# Patient Record
Sex: MALE | Race: WHITE | NOT HISPANIC OR LATINO | Employment: PART TIME | URBAN - NONMETROPOLITAN AREA
[De-identification: names, ages, dates, MRNs, and addresses within clinical notes are randomized per-mention and may not be internally consistent; named-entity substitution may affect disease eponyms.]

---

## 2021-03-22 ENCOUNTER — IMMUNIZATION (OUTPATIENT)
Dept: VACCINE CLINIC | Facility: HOSPITAL | Age: 21
End: 2021-03-22

## 2021-03-22 PROCEDURE — 91300 HC SARSCOV02 VAC 30MCG/0.3ML IM: CPT | Performed by: INTERNAL MEDICINE

## 2021-03-22 PROCEDURE — 0001A: CPT | Performed by: INTERNAL MEDICINE

## 2021-04-12 ENCOUNTER — IMMUNIZATION (OUTPATIENT)
Dept: VACCINE CLINIC | Facility: HOSPITAL | Age: 21
End: 2021-04-12

## 2021-04-12 PROCEDURE — 91300 HC SARSCOV02 VAC 30MCG/0.3ML IM: CPT | Performed by: INTERNAL MEDICINE

## 2021-04-12 PROCEDURE — 0002A: CPT | Performed by: INTERNAL MEDICINE

## 2022-09-05 ENCOUNTER — APPOINTMENT (OUTPATIENT)
Dept: CT IMAGING | Facility: HOSPITAL | Age: 22
End: 2022-09-05
Payer: MEDICAID

## 2022-09-05 ENCOUNTER — HOSPITAL ENCOUNTER (EMERGENCY)
Facility: HOSPITAL | Age: 22
Discharge: ANOTHER HOSPITAL | End: 2022-09-06
Attending: EMERGENCY MEDICINE
Payer: MEDICAID

## 2022-09-05 VITALS
SYSTOLIC BLOOD PRESSURE: 113 MMHG | DIASTOLIC BLOOD PRESSURE: 71 MMHG | HEIGHT: 68 IN | WEIGHT: 185 LBS | BODY MASS INDEX: 28.04 KG/M2 | OXYGEN SATURATION: 96 % | HEART RATE: 60 BPM | RESPIRATION RATE: 14 BRPM | TEMPERATURE: 98.1 F

## 2022-09-05 DIAGNOSIS — K35.30 ACUTE APPENDICITIS WITH LOCALIZED PERITONITIS, WITHOUT PERFORATION, ABSCESS, OR GANGRENE: Primary | ICD-10-CM

## 2022-09-05 LAB
A/G RATIO: 1.7 (ref 0.8–2)
ALBUMIN SERPL-MCNC: 4.7 G/DL (ref 3.4–4.8)
ALP BLD-CCNC: 67 U/L (ref 25–100)
ALT SERPL-CCNC: 20 U/L (ref 4–36)
ANION GAP SERPL CALCULATED.3IONS-SCNC: 10 MMOL/L (ref 3–16)
AST SERPL-CCNC: 16 U/L (ref 8–33)
BASOPHILS ABSOLUTE: 0 K/UL (ref 0–0.1)
BASOPHILS RELATIVE PERCENT: 0.2 %
BILIRUB SERPL-MCNC: 0.8 MG/DL (ref 0.3–1.2)
BILIRUBIN URINE: NEGATIVE
BLOOD, URINE: NEGATIVE
BUN BLDV-MCNC: 15 MG/DL (ref 6–20)
CALCIUM SERPL-MCNC: 9.3 MG/DL (ref 8.5–10.5)
CHLORIDE BLD-SCNC: 101 MMOL/L (ref 98–107)
CLARITY: CLEAR
CO2: 25 MMOL/L (ref 20–30)
COLOR: YELLOW
CREAT SERPL-MCNC: 0.8 MG/DL (ref 0.4–1.2)
EOSINOPHILS ABSOLUTE: 0 K/UL (ref 0–0.4)
EOSINOPHILS RELATIVE PERCENT: 0 %
GFR AFRICAN AMERICAN: >59
GFR NON-AFRICAN AMERICAN: >59
GLOBULIN: 2.7 G/DL
GLUCOSE BLD-MCNC: 153 MG/DL (ref 74–106)
GLUCOSE URINE: NEGATIVE MG/DL
HCT VFR BLD CALC: 42.5 % (ref 40–54)
HEMOGLOBIN: 14.3 G/DL (ref 13–18)
IMMATURE GRANULOCYTES #: 0.1 K/UL
IMMATURE GRANULOCYTES %: 0.4 % (ref 0–5)
KETONES, URINE: 80 MG/DL
LEUKOCYTE ESTERASE, URINE: NEGATIVE
LIPASE: 11 U/L (ref 5.6–51.3)
LYMPHOCYTES ABSOLUTE: 1.6 K/UL (ref 1.5–4)
LYMPHOCYTES RELATIVE PERCENT: 10.1 %
MCH RBC QN AUTO: 29.2 PG (ref 27–32)
MCHC RBC AUTO-ENTMCNC: 33.6 G/DL (ref 31–35)
MCV RBC AUTO: 86.9 FL (ref 80–100)
MICROSCOPIC EXAMINATION: ABNORMAL
MONOCYTES ABSOLUTE: 0.7 K/UL (ref 0.2–0.8)
MONOCYTES RELATIVE PERCENT: 4.4 %
NEUTROPHILS ABSOLUTE: 13.5 K/UL (ref 2–7.5)
NEUTROPHILS RELATIVE PERCENT: 84.9 %
NITRITE, URINE: NEGATIVE
PDW BLD-RTO: 12.6 % (ref 11–16)
PH UA: 7 (ref 5–8)
PLATELET # BLD: 238 K/UL (ref 150–400)
PMV BLD AUTO: 9.2 FL (ref 6–10)
POTASSIUM SERPL-SCNC: 4 MMOL/L (ref 3.4–5.1)
PROTEIN UA: NEGATIVE MG/DL
RBC # BLD: 4.89 M/UL (ref 4.5–6)
SARS-COV-2, NAAT: NOT DETECTED
SODIUM BLD-SCNC: 136 MMOL/L (ref 136–145)
SPECIFIC GRAVITY UA: 1.01 (ref 1–1.03)
TOTAL PROTEIN: 7.4 G/DL (ref 6.4–8.3)
URINE REFLEX TO CULTURE: ABNORMAL
URINE TYPE: ABNORMAL
UROBILINOGEN, URINE: 0.2 E.U./DL
WBC # BLD: 15.9 K/UL (ref 4–11)

## 2022-09-05 PROCEDURE — 96376 TX/PRO/DX INJ SAME DRUG ADON: CPT

## 2022-09-05 PROCEDURE — 80053 COMPREHEN METABOLIC PANEL: CPT

## 2022-09-05 PROCEDURE — 6360000002 HC RX W HCPCS: Performed by: EMERGENCY MEDICINE

## 2022-09-05 PROCEDURE — 74177 CT ABD & PELVIS W/CONTRAST: CPT

## 2022-09-05 PROCEDURE — 93005 ELECTROCARDIOGRAM TRACING: CPT

## 2022-09-05 PROCEDURE — 99285 EMERGENCY DEPT VISIT HI MDM: CPT

## 2022-09-05 PROCEDURE — 2580000003 HC RX 258: Performed by: EMERGENCY MEDICINE

## 2022-09-05 PROCEDURE — 81003 URINALYSIS AUTO W/O SCOPE: CPT

## 2022-09-05 PROCEDURE — 85025 COMPLETE CBC W/AUTO DIFF WBC: CPT

## 2022-09-05 PROCEDURE — 87635 SARS-COV-2 COVID-19 AMP PRB: CPT

## 2022-09-05 PROCEDURE — 6360000004 HC RX CONTRAST MEDICATION: Performed by: EMERGENCY MEDICINE

## 2022-09-05 PROCEDURE — 96375 TX/PRO/DX INJ NEW DRUG ADDON: CPT

## 2022-09-05 PROCEDURE — 83690 ASSAY OF LIPASE: CPT

## 2022-09-05 PROCEDURE — 96365 THER/PROPH/DIAG IV INF INIT: CPT

## 2022-09-05 PROCEDURE — 36415 COLL VENOUS BLD VENIPUNCTURE: CPT

## 2022-09-05 RX ORDER — ONDANSETRON 2 MG/ML
4 INJECTION INTRAMUSCULAR; INTRAVENOUS ONCE
Status: COMPLETED | OUTPATIENT
Start: 2022-09-05 | End: 2022-09-05

## 2022-09-05 RX ORDER — SODIUM CHLORIDE 9 MG/ML
INJECTION, SOLUTION INTRAVENOUS CONTINUOUS
Status: DISCONTINUED | OUTPATIENT
Start: 2022-09-05 | End: 2022-09-06 | Stop reason: HOSPADM

## 2022-09-05 RX ORDER — MORPHINE SULFATE 4 MG/ML
4 INJECTION, SOLUTION INTRAMUSCULAR; INTRAVENOUS ONCE
Status: COMPLETED | OUTPATIENT
Start: 2022-09-05 | End: 2022-09-05

## 2022-09-05 RX ADMIN — PIPERACILLIN AND TAZOBACTAM 3375 MG: 3; .375 INJECTION, POWDER, FOR SOLUTION INTRAVENOUS at 22:02

## 2022-09-05 RX ADMIN — IOPAMIDOL 100 ML: 755 INJECTION, SOLUTION INTRAVENOUS at 20:49

## 2022-09-05 RX ADMIN — MORPHINE SULFATE 4 MG: 4 INJECTION INTRAVENOUS at 21:57

## 2022-09-05 RX ADMIN — ONDANSETRON 4 MG: 2 INJECTION INTRAMUSCULAR; INTRAVENOUS at 20:37

## 2022-09-05 RX ADMIN — ONDANSETRON 4 MG: 2 INJECTION INTRAMUSCULAR; INTRAVENOUS at 21:57

## 2022-09-05 RX ADMIN — SODIUM CHLORIDE: 9 INJECTION, SOLUTION INTRAVENOUS at 20:37

## 2022-09-05 ASSESSMENT — PAIN DESCRIPTION - ORIENTATION: ORIENTATION: LOWER;MID

## 2022-09-05 ASSESSMENT — PAIN SCALES - GENERAL: PAINLEVEL_OUTOF10: 8

## 2022-09-05 ASSESSMENT — PAIN - FUNCTIONAL ASSESSMENT: PAIN_FUNCTIONAL_ASSESSMENT: 0-10

## 2022-09-05 ASSESSMENT — ENCOUNTER SYMPTOMS
NAUSEA: 1
VOMITING: 1
ABDOMINAL PAIN: 1

## 2022-09-05 ASSESSMENT — PAIN DESCRIPTION - FREQUENCY: FREQUENCY: CONTINUOUS

## 2022-09-05 ASSESSMENT — PAIN DESCRIPTION - DESCRIPTORS: DESCRIPTORS: CRAMPING

## 2022-09-05 ASSESSMENT — PAIN DESCRIPTION - ONSET: ONSET: ON-GOING

## 2022-09-05 ASSESSMENT — PAIN DESCRIPTION - LOCATION: LOCATION: ABDOMEN

## 2022-09-05 ASSESSMENT — PAIN DESCRIPTION - PAIN TYPE: TYPE: ACUTE PAIN

## 2022-09-06 ENCOUNTER — HOSPITAL ENCOUNTER (OUTPATIENT)
Facility: HOSPITAL | Age: 22
Setting detail: HOSPITAL OUTPATIENT SURGERY
Discharge: HOME | End: 2022-09-06
Attending: SURGERY | Admitting: SURGERY

## 2022-09-06 ENCOUNTER — ANESTHESIA EVENT (OUTPATIENT)
Dept: PERIOP | Facility: HOSPITAL | Age: 22
End: 2022-09-06

## 2022-09-06 ENCOUNTER — ANESTHESIA (OUTPATIENT)
Dept: PERIOP | Facility: HOSPITAL | Age: 22
End: 2022-09-06

## 2022-09-06 VITALS
WEIGHT: 189.6 LBS | HEIGHT: 68 IN | OXYGEN SATURATION: 99 % | DIASTOLIC BLOOD PRESSURE: 74 MMHG | RESPIRATION RATE: 18 BRPM | SYSTOLIC BLOOD PRESSURE: 113 MMHG | TEMPERATURE: 97.5 F | HEART RATE: 76 BPM | BODY MASS INDEX: 28.73 KG/M2

## 2022-09-06 DIAGNOSIS — K37 APPENDICITIS: ICD-10-CM

## 2022-09-06 PROCEDURE — 25010000002 PROPOFOL 200 MG/20ML EMULSION: Performed by: NURSE ANESTHETIST, CERTIFIED REGISTERED

## 2022-09-06 PROCEDURE — 25010000002 ONDANSETRON PER 1 MG: Performed by: NURSE ANESTHETIST, CERTIFIED REGISTERED

## 2022-09-06 PROCEDURE — 25010000002 KETOROLAC TROMETHAMINE PER 15 MG: Performed by: NURSE ANESTHETIST, CERTIFIED REGISTERED

## 2022-09-06 PROCEDURE — 0 AMPICILLIN-SULBACTAM PER 1.5 G: Performed by: SURGERY

## 2022-09-06 PROCEDURE — 99219 PR INITIAL OBSERVATION CARE/DAY 50 MINUTES: CPT | Performed by: SURGERY

## 2022-09-06 PROCEDURE — 88304 TISSUE EXAM BY PATHOLOGIST: CPT

## 2022-09-06 PROCEDURE — 25010000002 DEXAMETHASONE PER 1 MG: Performed by: NURSE ANESTHETIST, CERTIFIED REGISTERED

## 2022-09-06 PROCEDURE — 44970 LAPAROSCOPY APPENDECTOMY: CPT | Performed by: SURGERY

## 2022-09-06 PROCEDURE — 25010000002 FENTANYL CITRATE (PF) 100 MCG/2ML SOLUTION: Performed by: NURSE ANESTHETIST, CERTIFIED REGISTERED

## 2022-09-06 DEVICE — ENDOPATH ETS45 2.5MM RELOADS (VASCULAR/THIN)
Type: IMPLANTABLE DEVICE | Site: ABDOMEN | Status: FUNCTIONAL
Brand: ENDOPATH

## 2022-09-06 DEVICE — LIGACLIP 10-M/L, 10MM ENDOSCOPIC ROTATING MULTIPLE CLIP APPLIERS
Type: IMPLANTABLE DEVICE | Site: ABDOMEN | Status: FUNCTIONAL
Brand: LIGACLIP

## 2022-09-06 RX ORDER — KETOROLAC TROMETHAMINE 30 MG/ML
INJECTION, SOLUTION INTRAMUSCULAR; INTRAVENOUS AS NEEDED
Status: DISCONTINUED | OUTPATIENT
Start: 2022-09-06 | End: 2022-09-06 | Stop reason: SURG

## 2022-09-06 RX ORDER — DEXTROSE, SODIUM CHLORIDE, SODIUM LACTATE, POTASSIUM CHLORIDE, AND CALCIUM CHLORIDE 5; .6; .31; .03; .02 G/100ML; G/100ML; G/100ML; G/100ML; G/100ML
125 INJECTION, SOLUTION INTRAVENOUS CONTINUOUS
Status: DISCONTINUED | OUTPATIENT
Start: 2022-09-06 | End: 2022-09-06

## 2022-09-06 RX ORDER — SODIUM CHLORIDE 0.9 % (FLUSH) 0.9 %
10 SYRINGE (ML) INJECTION AS NEEDED
Status: DISCONTINUED | OUTPATIENT
Start: 2022-09-06 | End: 2022-09-06 | Stop reason: HOSPADM

## 2022-09-06 RX ORDER — ONDANSETRON 2 MG/ML
4 INJECTION INTRAMUSCULAR; INTRAVENOUS ONCE AS NEEDED
Status: DISCONTINUED | OUTPATIENT
Start: 2022-09-06 | End: 2022-09-06 | Stop reason: HOSPADM

## 2022-09-06 RX ORDER — MORPHINE SULFATE 2 MG/ML
2 INJECTION, SOLUTION INTRAMUSCULAR; INTRAVENOUS EVERY 4 HOURS PRN
Status: DISCONTINUED | OUTPATIENT
Start: 2022-09-06 | End: 2022-09-06

## 2022-09-06 RX ORDER — NEOSTIGMINE METHYLSULFATE 5 MG/5 ML
SYRINGE (ML) INTRAVENOUS AS NEEDED
Status: DISCONTINUED | OUTPATIENT
Start: 2022-09-06 | End: 2022-09-06 | Stop reason: SURG

## 2022-09-06 RX ORDER — ROCURONIUM BROMIDE 10 MG/ML
INJECTION, SOLUTION INTRAVENOUS AS NEEDED
Status: DISCONTINUED | OUTPATIENT
Start: 2022-09-06 | End: 2022-09-06 | Stop reason: SURG

## 2022-09-06 RX ORDER — FENTANYL CITRATE 50 UG/ML
INJECTION, SOLUTION INTRAMUSCULAR; INTRAVENOUS AS NEEDED
Status: DISCONTINUED | OUTPATIENT
Start: 2022-09-06 | End: 2022-09-06 | Stop reason: SURG

## 2022-09-06 RX ORDER — DEXAMETHASONE SODIUM PHOSPHATE 4 MG/ML
INJECTION, SOLUTION INTRA-ARTICULAR; INTRALESIONAL; INTRAMUSCULAR; INTRAVENOUS; SOFT TISSUE AS NEEDED
Status: DISCONTINUED | OUTPATIENT
Start: 2022-09-06 | End: 2022-09-06 | Stop reason: SURG

## 2022-09-06 RX ORDER — ONDANSETRON 2 MG/ML
INJECTION INTRAMUSCULAR; INTRAVENOUS AS NEEDED
Status: DISCONTINUED | OUTPATIENT
Start: 2022-09-06 | End: 2022-09-06 | Stop reason: SURG

## 2022-09-06 RX ORDER — LIDOCAINE HYDROCHLORIDE 20 MG/ML
INJECTION, SOLUTION INTRAVENOUS AS NEEDED
Status: DISCONTINUED | OUTPATIENT
Start: 2022-09-06 | End: 2022-09-06 | Stop reason: SURG

## 2022-09-06 RX ORDER — SODIUM CHLORIDE, SODIUM LACTATE, POTASSIUM CHLORIDE, CALCIUM CHLORIDE 600; 310; 30; 20 MG/100ML; MG/100ML; MG/100ML; MG/100ML
1000 INJECTION, SOLUTION INTRAVENOUS CONTINUOUS
Status: DISCONTINUED | OUTPATIENT
Start: 2022-09-06 | End: 2022-09-06

## 2022-09-06 RX ORDER — SODIUM CHLORIDE 0.9 % (FLUSH) 0.9 %
10 SYRINGE (ML) INJECTION EVERY 12 HOURS SCHEDULED
Status: DISCONTINUED | OUTPATIENT
Start: 2022-09-06 | End: 2022-09-06 | Stop reason: HOSPADM

## 2022-09-06 RX ORDER — PROPOFOL 10 MG/ML
INJECTION, EMULSION INTRAVENOUS AS NEEDED
Status: DISCONTINUED | OUTPATIENT
Start: 2022-09-06 | End: 2022-09-06 | Stop reason: SURG

## 2022-09-06 RX ORDER — BUPIVACAINE HYDROCHLORIDE 5 MG/ML
INJECTION, SOLUTION EPIDURAL; INTRACAUDAL AS NEEDED
Status: DISCONTINUED | OUTPATIENT
Start: 2022-09-06 | End: 2022-09-06 | Stop reason: HOSPADM

## 2022-09-06 RX ORDER — HYDROCODONE BITARTRATE AND ACETAMINOPHEN 5; 325 MG/1; MG/1
1-2 TABLET ORAL EVERY 4 HOURS PRN
Qty: 12 TABLET | Refills: 0 | Status: SHIPPED | OUTPATIENT
Start: 2022-09-06

## 2022-09-06 RX ORDER — ONDANSETRON 2 MG/ML
4 INJECTION INTRAMUSCULAR; INTRAVENOUS ONCE AS NEEDED
Status: DISCONTINUED | OUTPATIENT
Start: 2022-09-06 | End: 2022-09-06

## 2022-09-06 RX ADMIN — FENTANYL CITRATE 100 MCG: 50 INJECTION INTRAMUSCULAR; INTRAVENOUS at 06:18

## 2022-09-06 RX ADMIN — SODIUM CHLORIDE, POTASSIUM CHLORIDE, SODIUM LACTATE AND CALCIUM CHLORIDE 1000 ML: 600; 310; 30; 20 INJECTION, SOLUTION INTRAVENOUS at 05:55

## 2022-09-06 RX ADMIN — SODIUM CHLORIDE 3 G: 900 INJECTION, SOLUTION INTRAVENOUS at 01:00

## 2022-09-06 RX ADMIN — LIDOCAINE HYDROCHLORIDE 100 MG: 20 INJECTION, SOLUTION INTRAVENOUS at 06:09

## 2022-09-06 RX ADMIN — KETOROLAC TROMETHAMINE 30 MG: 30 INJECTION, SOLUTION INTRAMUSCULAR at 06:22

## 2022-09-06 RX ADMIN — ROCURONIUM BROMIDE 25 MG: 10 INJECTION INTRAVENOUS at 06:09

## 2022-09-06 RX ADMIN — GLYCOPYRROLATE 0.4 MG: 0.2 INJECTION, SOLUTION INTRAMUSCULAR; INTRAVENOUS at 06:25

## 2022-09-06 RX ADMIN — SODIUM CHLORIDE, SODIUM LACTATE, POTASSIUM CHLORIDE, CALCIUM CHLORIDE AND DEXTROSE MONOHYDRATE 125 ML/HR: 5; 600; 310; 30; 20 INJECTION, SOLUTION INTRAVENOUS at 00:56

## 2022-09-06 RX ADMIN — FENTANYL CITRATE 100 MCG: 50 INJECTION INTRAMUSCULAR; INTRAVENOUS at 06:09

## 2022-09-06 RX ADMIN — PROPOFOL 180 MG: 10 INJECTION, EMULSION INTRAVENOUS at 06:09

## 2022-09-06 RX ADMIN — DEXAMETHASONE SODIUM PHOSPHATE 4 MG: 4 INJECTION, SOLUTION INTRAMUSCULAR; INTRAVENOUS at 06:15

## 2022-09-06 RX ADMIN — ONDANSETRON 4 MG: 2 INJECTION INTRAMUSCULAR; INTRAVENOUS at 06:22

## 2022-09-06 RX ADMIN — Medication 3 MG: at 06:25

## 2022-09-06 NOTE — ED PROVIDER NOTES
[address] ENCOUNTER      Pt Name: Angela Varela  MRN: [id number]  [place] 2000  Date of evaluation: 9/5/2022  Provider: Efrain Raines MD    CHIEF COMPLAINT       Chief Complaint   Patient presents with    Abdominal Pain         HISTORY OF PRESENT ILLNESS   (Location/Symptom, Timing/Onset, Context/Setting, Quality, Duration, Modifying Factors, Severity)  Note limiting factors. Stephon Quarles is a 25year old male that reported to the emergency room with his mother complaining with right lower quadrant abdominal pain onset approximately 8 hours ago, gradually getting worse, associated with nausea and vomiting (4 episodes), however he denies dysuria, diarrhea or fever. No previous similar episodes the past.  No exposure to sick contacts, no travel. No shortness of breath or cough    The history is provided by the patient. No  was used. Nursing Notes were reviewed. REVIEW OF SYSTEMS    (2-9 systems for level 4, 10 or more for level 5)     Review of Systems   Gastrointestinal:  Positive for abdominal pain, nausea and vomiting. All other systems reviewed and are negative. Except as noted above the remainder of the review of systems was reviewed and negative. PAST MEDICAL HISTORY   History reviewed. No pertinent past medical history. SURGICAL HISTORY       Past Surgical History:   Procedure Laterality Date    LASIK Bilateral          CURRENT MEDICATIONS       Previous Medications    No medications on file       ALLERGIES     Patient has no known allergies. FAMILY HISTORY     History reviewed. No pertinent family history.        SOCIAL HISTORY       Social History     Socioeconomic History    Marital status: Single     Spouse name: None    Number of children: None    Years of education: None    Highest education level: None   Tobacco Use    Smoking status: Never   Substance and Sexual Activity    Alcohol Behavior: Behavior normal.         Judgment: Judgment normal.       DIAGNOSTIC RESULTS     EKG: All EKG's are interpreted by the Emergency Department Physician who either signs or Co-signs this chart in the absence of a cardiologist.        RADIOLOGY:   Non-plain film images such as CT, Ultrasound and MRI are read by the radiologist. Plain radiographic images are visualized and preliminarily interpreted by the emergency physician with the below findings:    EXAM: CT ABDOMEN AND PELVIS WITH CONTRAST     INDICATION: RLQ abd pain, Pain     COMPARISON: None. TECHNIQUE: Axial CT imaging obtained from lung bases through pelvis. Axial images and multiplanar reformatted images are provided for review. Up-to-date CT equipment and radiation dose reduction techniques were employed. IV Contrast: 100 cc Omnipaque 350  Oral Contrast: Yes. FINDINGS:     LUNG BASES: Clear. LIVER: Normal.     GALLBLADDER AND BILIARY TREE: No calcified gallstones. No gallbladder distention. No intra- or extrahepatic biliary dilatation. PANCREAS: Normal.     SPLEEN: Normal.     ADRENAL GLANDS: Normal.     KIDNEYS AND URETERS: No hydronephrosis. Symmetrical normal enhancement. URINARY BLADDER: Normal.     REPRODUCTIVE ORGANS: No associated masses. BOWEL: Normal diameter, nonobstructed. There is appendicolith at the base of the appendix, best seen on coronal 59. Increased caliber of the appendix with mucosal enhancement. Small amount of fat stranding. No evidence of adjacent fluid collection. LYMPH NODES: No abnormally enlarged nodes. PERITONEUM/RETROPERITONEUM: No ascites or free air. VESSELS: Aorta and IVC without significant abnormality. Splenic vein, SMV, PV and hepatic veins demonstrate enhancement. ABDOMINAL WALL: Normal.     BONES: No significant abnormality. OTHER FINDINGS: None. IMPRESSION:     Findings compatible with acute, uncomplicated appendicitis.     Interpretation per the Radiologist below, if available at the time of this note:    CT ABDOMEN PELVIS W IV CONTRAST Additional Contrast? None   Final Result      Findings compatible with acute, uncomplicated appendicitis. ED BEDSIDE ULTRASOUND:   Performed by ED Physician - none    LABS:  Labs Reviewed   COMPREHENSIVE METABOLIC PANEL - Abnormal; Notable for the following components:       Result Value    Glucose 153 (*)     All other components within normal limits   CBC WITH AUTO DIFFERENTIAL - Abnormal; Notable for the following components:    WBC 15.9 (*)     Neutrophils Absolute 13.5 (*)     All other components within normal limits   URINALYSIS WITH REFLEX TO CULTURE - Abnormal; Notable for the following components:    Ketones, Urine 80 (*)     All other components within normal limits   COVID-19, RAPID   LIPASE       All other labs were within normal range or not returned as of this dictation. EMERGENCY DEPARTMENT COURSE and DIFFERENTIAL DIAGNOSIS/MDM:   Vitals:    Vitals:    09/05/22 2149 09/05/22 2200 09/05/22 2215 09/05/22 2230   BP: 139/74 125/78 118/74 113/71   Pulse:    60   Resp:       Temp:       TempSrc:       SpO2: 97% 99% 96% 96%   Weight:       Height:         Differential diagnosis: Acute appendicitis, acute colitis, acute enteritis, Meckel's colitis, UTI, ileus, small bowel obstruction, etc.    Patient sent to radiology for CT scan abdomen pelvis with IV contrast, blood work obtained, advised patient to remain n.p.o. MDM        REASSESSMENT     ED Course as of 09/05/22 2340   Mon Sep 05, 2022   2334 Review of work-up shows leukocytosis with no left shift, CAT scan abdomen and pelvis shows acute appendicitis without perforation, without abscess formation. Renal and hepatic function normal, electrolytes within normal limits. Urinalysis consistent with hydration only.  [DS]      ED Course User Index  [DS] Isrrael Jj MD     21:55 -CT scan abdomen and pelvis shows noncomplicated appendicitis, patient reevaluated, found to be in pain we will administer morphine and Zofran IV. Call initiated with [facility] in order to arrange for transfer, as our facility does not have a general surgeon on-call. 22:12 - case d/w Dr Deepali Horan, general surgeon on-call for [facility], advise of patient's presentation and findings, agreeable with transfer. [facility] called back, advising to have open beds, will proceed with transfer. CRITICAL CARE TIME   Total Critical Care time was zero minutes, excluding separately reportable procedures. There was a high probability of clinically significant/life threatening deterioration in the patient's condition which required my urgent intervention. CONSULTS:  None    PROCEDURES:  Unless otherwise noted below, none     Procedures        FINAL IMPRESSION      1. Acute appendicitis with localized peritonitis, without perforation, abscess, or gangrene Stable         DISPOSITION/PLAN   DISPOSITION Decision To Transfer 09/05/2022 10:20:37 PM      PATIENT REFERRED TO:  No follow-up provider specified. DISCHARGE MEDICATIONS:  New Prescriptions    No medications on file     Controlled Substances Monitoring:     No flowsheet data found.     (Please note that portions of this note were completed with a voice recognition program.  Efforts were made to edit the dictations but occasionally words are mis-transcribed.)    Josi Vanegas MD (electronically signed)  Attending Emergency Physician             Josi Vanegas MD  09/05/22 [phone]

## 2022-09-06 NOTE — ANESTHESIA PREPROCEDURE EVALUATION
Anesthesia Evaluation     Patient summary reviewed and Nursing notes reviewed   NPO Solid Status: > 8 hours  NPO Liquid Status: > 8 hours           Airway   Mallampati: I  TM distance: >3 FB  Neck ROM: full  No difficulty expected  Dental - normal exam     Pulmonary - negative pulmonary ROS    breath sounds clear to auscultation  Cardiovascular - negative cardio ROS    Rhythm: regular  Rate: normal        Neuro/Psych- negative ROS  GI/Hepatic/Renal/Endo - negative ROS     Musculoskeletal (-) negative ROS    Abdominal    Substance History - negative use     OB/GYN negative ob/gyn ROS         Other - negative ROS                       Anesthesia Plan    ASA 1 - emergent     general     intravenous induction     Anesthetic plan, risks, benefits, and alternatives have been provided, discussed and informed consent has been obtained with: patient and mother.  Pre-procedure education provided  Plan discussed with CRNA.        CODE STATUS:

## 2022-09-06 NOTE — ED NOTES
Spoke with [facility], requested a face sheet at [phone]  Pt will be going to surgery around 4 am.      Lavinia Banda RN  09/05/22 1880

## 2022-09-06 NOTE — DISCHARGE SUMMARY
Date of Discharge:  9/6/2022    Discharge Diagnosis:   Acute appendicitis    Problem List:    Appendicitis      Presenting Problem/History of Present Illness  Appendicitis [K37]        Hospital Course  Patient is a 22 y.o. male presented with acute appendicitis on CT scan.  Admitted for observation prior to undergoing laparoscopic appendectomy.  Patient underwent laparoscopic appendectomy complication.  He was discharged from the recovery room without difficulty.  No complications..        Physical Exam:     General Appearance:    Alert, cooperative, in no acute distress   Head:    Normocephalic, without obvious abnormality, atraumatic   Eyes:            Lids and lashes normal, conjunctivae and sclerae normal, no   icterus, no pallor, corneas clear, PERRLA   Ears:    Ears appear intact with no abnormalities noted   Throat:   No oral lesions, no thrush, oral mucosa moist   Neck:   No adenopathy, supple, trachea midline, no thyromegaly, no   carotid bruit, no JVD   Back:     No kyphosis present, no scoliosis present, no skin lesions,      erythema or scars, no tenderness to percussion or                   palpation,   range of motion normal   Lungs:     Clear to auscultation,respirations regular, even and                  unlabored    Heart:    Regular rhythm and normal rate, normal S1 and S2, no            murmur, no gallop, no rub, no click   Chest Wall:    No abnormalities observed   Abdomen:     Normal bowel sounds, no masses, no organomegaly, soft        non-tender, non-distended, no guarding, no rebound                Tenderness.  Tender right lower quadrant.   Rectal:     Deferred   Extremities:   Moves all extremities well, no edema, no cyanosis, no             redness   Pulses:   Pulses palpable and equal bilaterally   Skin:   No bleeding, bruising or rash   Lymph nodes:   No palpable adenopathy   Neurologic:   Cranial nerves 2 - 12 grossly intact, sensation intact, DTR       present and equal bilaterally        Procedures Performed  Procedure(s):  APPENDECTOMY LAPAROSCOPIC       Consults:   Consults     No orders found from 8/8/2022 to 9/7/2022.          Pertinent labs/studies reviewed with patient    Condition on Discharge: Stable    Vital Signs  Temp:  [97.4 °F (36.3 °C)-98.2 °F (36.8 °C)] 97.4 °F (36.3 °C)  Heart Rate:  [58-98] 67  Resp:  [13-20] 15  BP: (104-133)/(55-89) 107/75    Discharge Disposition  Home or Self Care    Discharge Medications     Discharge Medications      New Medications      Instructions Start Date   HYDROcodone-acetaminophen 5-325 MG per tablet  Commonly known as: NORCO   1-2 tablets, Oral, Every 4 Hours PRN             Discharge Diet   Diet Instructions     Advance Diet as Tolerated      Advance to low-fat diet.          Activity at Discharge  Activity Instructions     Discharge Activity      1) No driving for 2-3 days and no longer taking narcotics.   2) May shower / sponge bathe for 48 hours.  3) Do not lift / push / pull more then 15 lbs.          Follow-up Appointments  No future appointments.  Additional Instructions for the Follow-ups that You Need to Schedule     Discharge Follow-up with Specified Provider: yan; 1 Week   As directed      To: yan    Follow Up: 1 Week                 Treva Sarmiento MD  9/6/2022  07:16 EDT

## 2022-09-06 NOTE — ANESTHESIA POSTPROCEDURE EVALUATION
Patient: Israel Jiménez    Procedure Summary     Date: 09/06/22 Room / Location: [facility] OR  /  [facility]    Anesthesia Start: 0605 Anesthesia Stop: 0639    Procedure: APPENDECTOMY LAPAROSCOPIC (N/A Abdomen) Diagnosis: (Appendicitis)    Surgeons: Treva Sarmiento MD Provider: Cory Pace CRNA    Anesthesia Type: general ASA Status: 1 - Emergent          Anesthesia Type: general    Vitals  Vitals Value Taken Time   /75 09/06/22 0645   Temp 97.4 °F (36.3 °C) 09/06/22 0641   Pulse 70 09/06/22 0650   Resp 14 09/06/22 0645   SpO2 97 % 09/06/22 0650   Vitals shown include unvalidated device data.        Post Anesthesia Care and Evaluation    Patient location during evaluation: PACU  Patient participation: complete - patient participated  Level of consciousness: awake and alert  Pain score: 1  Pain management: adequate    Airway patency: patent  Anesthetic complications: No anesthetic complications  PONV Status: none  Cardiovascular status: acceptable  Respiratory status: acceptable  Hydration status: acceptable  No anesthesia care post op

## 2022-09-06 NOTE — PLAN OF CARE
Goal Outcome Evaluation:  Plan of Care Reviewed With: patient        Progress: no change  Outcome Evaluation: Patient admitted with appendicitis, awaiting surgery this am, VSS, minimal c/o pain, will continue to monitor

## 2022-09-06 NOTE — H&P
Reason for Consultation: Appendicitis      Chief complaint:  Abdominal pain    SUBJECTIVE:    History of present illness:  I did see the patient in the ER today as a consultation for evaluation and treatment of right lower quadrant pain for the last 24 hours.  CT scan indicates acute appendicitis.  Patient being admitted for IV fluids antibiotics and urgent laparoscopic appendectomy.  No previous abdominal pain similar to this in the past.  No previous abdominal surgery.    Review of Systems:    Review of Systems - General ROS: negative for - chills, fatigue, fever, hot flashes, malaise or night sweats  Psychological ROS: negative for - behavioral disorder, disorientation, hallucinations, hostility or mood swings  ENT ROS: negative for - nasal polyps, oral lesions, sinus pain, sneezing or sore throat  Breast ROS: negative for - galactorrhea or new or changing breast lumps  Respiratory ROS: negative for - hemoptysis, orthopnea, pleuritic pain, sputum changes or stridor  Cardiovascular ROS: negative for - dyspnea on exertion, edema, irregular heartbeat, murmur, orthopnea, palpitations or rapid heart rate  Gastrointestinal ROS: negative for - change in stools, gas/bloating, hematemesis, melena or stool incontinence. There is a history of nausea with right lower quadrant pain  Genito-Urinary ROS: negative for - dysuria, genital ulcers, nocturia or pelvic pain  Musculoskeletal ROS: negative for - gait disturbance or muscle pain  Neurological ROS: negative for - dizziness, gait disturbance, memory loss, numbness/tingling or seizures      Allergies:  No Known Allergies    Medications:    Current Facility-Administered Medications:   •  dextrose 5 % and lactated Ringer's infusion, 125 mL/hr, Intravenous, Continuous, Treva Sarmiento MD, Last Rate: 125 mL/hr at 09/06/22 0056, 125 mL/hr at 09/06/22 0056  •  Morphine sulfate (PF) injection 2 mg, 2 mg, Intravenous, Q4H PRN, Treva Sarmiento MD    History:  No past medical  history on file.    Past Surgical History:   Procedure Laterality Date   • HERNIA REPAIR     • LASIK Bilateral        No family history on file.    Social History     Tobacco Use   • Smoking status: Never Smoker   • Smokeless tobacco: Never Used   Vaping Use   • Vaping Use: Never used   Substance Use Topics   • Alcohol use: Never   • Drug use: Never          OBJECTIVE:    Vital Signs   Temp:  [98 °F (36.7 °C)-98.2 °F (36.8 °C)] 98.2 °F (36.8 °C)  Heart Rate:  [58-98] 94  Resp:  [14-20] 18  BP: (104-114)/(55-84) 104/55    Physical Exam:     General Appearance:    Alert, cooperative, in no acute distress   Head:    Normocephalic, without obvious abnormality, atraumatic   Eyes:            Lids and lashes normal, conjunctivae and sclerae normal, no   icterus, no pallor, corneas clear, PERRLA   Ears:    Ears appear intact with no abnormalities noted   Throat:   No oral lesions, no thrush, oral mucosa moist   Neck:   No adenopathy, supple, trachea midline, no thyromegaly, no   carotid bruit, no JVD   Back:     No kyphosis present, no scoliosis present, no skin lesions,      erythema or scars, no tenderness to percussion or                   palpation,   range of motion normal   Lungs:     Clear to auscultation,respirations regular, even and                  unlabored    Heart:    Regular rhythm and normal rate, normal S1 and S2, no            murmur, no gallop, no rub, no click   Chest Wall:    No abnormalities observed   Abdomen:     Normal bowel sounds, no masses, no organomegaly, soft        non-tender, non-distended, no guarding, there is evidence of right lower quadrant tenderness   Extremities:   Moves all extremities well, no edema, no cyanosis, no             redness   Pulses:   Pulses palpable and equal bilaterally   Skin:   No bleeding, bruising or rash   Lymph nodes:   No palpable adenopathy   Neurologic:   Cranial nerves 2 - 12 grossly intact, sensation intact, DTR       present and equal bilaterally        Results Review:   I reviewed the patient's new clinical results.      ASSESSMENT/PLAN:    Acute appendicitis    I did have a detailed and extensive discussion with the patient in the hospital and they understand that they need to undergo laparoscopic appendectomy or possible open appendectomy. Full risks and benefits of operative versus nonoperative intervention were discussed with the patient and these included things such as nonresolution of symptoms and possible worsening of symptoms without surgical intervention versus infection, bleeding, open appendectomy, postoperative abscess, etc with surgical intervention. The patient understands, agrees, and wishes to proceed with the surgical treatment plan as mentioned above. The patient had no questions for me at the end of the discussion.      I discussed the patients findings and my recommendations with patient and consulting provider.        Treva Sarmiento MD  09/06/22

## 2022-09-06 NOTE — DISCHARGE INSTRUCTIONS
To assist you in voiding:  Drink plenty of fluids  Listen to running water while attempting to void.    If you are unable to urinate and you have an uncomfortable urge to void or it has been   6 hours since you were discharged, return to the Emergency Room     No pushing, pulling, tugging,  heavy lifting, or strenuous activity.  No major decision making, driving, or drinking alcoholic beverages for 24 hours. ( due to the medications you have  received)  Always use good hand hygiene/washing techniques.  NO driving while taking pain medications.    * if you have an incision:  Check your incision area every day for signs of infection.   Check for:  * more redness, swelling, or pain  *more fluid or blood  *warmth  *pus or bad smell

## 2022-09-06 NOTE — ED TRIAGE NOTES
Patient c/o lower midline abdominal pain x 8 hours. Patient also reports emesis x 4 episodes today. Reports one episode was \"blood tinged. \"  Patient also reports 1 episode of syncope on the way here.

## 2022-09-06 NOTE — ANESTHESIA PROCEDURE NOTES
Airway  Urgency: elective    Date/Time: 9/6/2022 6:11 AM  Airway not difficult    General Information and Staff    Patient location during procedure: OR  CRNA/CAA: Cory Pace CRNA    Indications and Patient Condition  Indications for airway management: airway protection    Preoxygenated: yes  Mask difficulty assessment: 1 - vent by mask    Final Airway Details  Final airway type: endotracheal airway      Successful airway: ETT  Cuffed: yes   Successful intubation technique: direct laryngoscopy  Endotracheal tube insertion site: oral  Blade: Kurtz  Blade size: 2  ETT size (mm): 7.5  Cormack-Lehane Classification: grade I - full view of glottis  Placement verified by: chest auscultation and capnometry   Measured from: teeth  ETT/EBT  to teeth (cm): 23  Number of attempts at approach: 1  Assessment: lips, teeth, and gum same as pre-op and atraumatic intubation

## 2022-09-06 NOTE — ED NOTES
Tonio castellanos at [facility] called back and they want to admit the pt to ICU bed 5.  Call report to [phone]     Rob Colvin RN  09/05/22 2889

## 2022-09-06 NOTE — ED NOTES
Report called to CK Luke @ [facility] @ this time. Patient will be going to ICU 5.      Kayla Meléndez RN  09/05/22 [id number]

## 2022-09-07 LAB — REF LAB TEST METHOD: NORMAL

## 2022-09-07 NOTE — OP NOTE
PATIENT:    Israel Jiménez    DATE OF SURGERY:    9/6/2022    PHYSICIAN:    Treva Sarmiento MD    REFERRING PHYSICIAN:  Provider, No Known    YOB: 2000    PREOPERATIVE DIAGNOSIS:  Acute appendicitis.    POSTOPERATIVE DIAGNOSIS:  Acute appendicitis.    PROCEDURE:  Laparoscopic appendectomy.    EBL:  Less than 50 cc    COMPLICATIONS:  None    OPERATIVE PROCEDURE:  The patient was taken to the operating room in the normal manner and given general endotracheal anesthesia.  The patient was also given preoperative IV antibiotics.  I did discuss the situation with the patient preoperatively and I marked them accordingly.  An appropriate timeout was performed intraoperatively prior to the incision.      A supraumbilical incision was made to insert a Veress needle to insufflate the abdomen with CO2.  A 5 mm Optiview was inserted here and good visualization was obtained.  A separate suprapubic 5 mm Optiview was inserted along with the left lower quadrant 12 mm Optiview.      The appendix was found to be in the right lower quadrant and was dissected free.  It was grasped with graspers without teeth.  The appendiceal mesentery was dissected free and then divided with an Endo-GI stapling device as was the base of the appendix itself.  The appendix was placed in the Endobag and removed via the left lower quadrant 12 mm port site.    Clips were placed on the mesentery for further hemostasis, and copious irrigation was used in the patient’s abdominal cavity.  It was clean and dry at this point in time.  All trocars were injected with Marcaine for postoperative anesthetic and then removed under direct visualization.  0-Vicryl suture was used to close the fascia and 4-0 subcuticular stitch and Steri-Strips were used to close the skin.  The patient was stable at this point in time and subsequently transferred back to the recovery room in stable condition.      Treva Sarmiento MD  9/7/2022  10:34 EDT

## 2022-09-15 NOTE — PROGRESS NOTES
"Patient: Israel Jiménez    YOB: 2000    Date: 09/16/2022    Primary Care Provider: Esther Blue OD    Chief Complaint   Patient presents with   • Post-op     Post op appendectomy        History of present illness:  I saw the patient in the office today as a followup from their recent laparoscopic appendectomy.  They state that they have done well and are having no complaints.    Vital Signs:   Vitals:    09/16/22 1400   BP: 120/80   BP Location: Right arm   Patient Position: Sitting   Cuff Size: Adult   Pulse: 104   Temp: 98.6 °F (37 °C)   SpO2: 98%   Weight: 87.1 kg (192 lb)   Height: 172.7 cm (68\")       Physical Exam:   General Appearance:    Alert, cooperative, in no acute distress   Abdomen:     no masses, no organomegaly, soft non-tender, non-distended, no guarding, wounds are well healed     Assessment / Plan:    1. Postoperative visit        I did discuss the situation with the patient today in the office and they have done well from their recent laparoscopic appendectomy.  I have released the patient back to normal activity, they understand that they need to be careful about heavy lifting.  I need to see the patient back in the office only if they are having further problems, they know to call me if they are.      Electronically signed by Treva Sarmiento MD  09/19/22                    "

## 2022-09-16 ENCOUNTER — OFFICE VISIT (OUTPATIENT)
Dept: SURGERY | Facility: CLINIC | Age: 22
End: 2022-09-16

## 2022-09-16 VITALS
DIASTOLIC BLOOD PRESSURE: 80 MMHG | WEIGHT: 192 LBS | TEMPERATURE: 98.6 F | SYSTOLIC BLOOD PRESSURE: 120 MMHG | OXYGEN SATURATION: 98 % | HEART RATE: 104 BPM | HEIGHT: 68 IN | BODY MASS INDEX: 29.1 KG/M2

## 2022-09-16 DIAGNOSIS — Z48.89 POSTOPERATIVE VISIT: Primary | ICD-10-CM

## 2022-09-16 PROCEDURE — 99024 POSTOP FOLLOW-UP VISIT: CPT | Performed by: SURGERY

## 2022-09-16 RX ORDER — LORATADINE 10 MG/1
TABLET ORAL
COMMUNITY

## 2022-09-16 RX ORDER — AZITHROMYCIN 250 MG/1
TABLET, FILM COATED ORAL
COMMUNITY
Start: 2022-09-12

## 2022-09-16 RX ORDER — CETIRIZINE HYDROCHLORIDE 10 MG/1
TABLET ORAL
COMMUNITY
Start: 2022-09-12

## 2024-03-16 ENCOUNTER — APPOINTMENT (OUTPATIENT)
Dept: GENERAL RADIOLOGY | Facility: HOSPITAL | Age: 24
End: 2024-03-16
Payer: COMMERCIAL

## 2024-03-16 ENCOUNTER — HOSPITAL ENCOUNTER (EMERGENCY)
Facility: HOSPITAL | Age: 24
Discharge: HOME | End: 2024-03-16
Attending: EMERGENCY MEDICINE
Payer: COMMERCIAL

## 2024-03-16 VITALS
HEIGHT: 68 IN | BODY MASS INDEX: 30.31 KG/M2 | RESPIRATION RATE: 18 BRPM | TEMPERATURE: 98.1 F | WEIGHT: 200 LBS | DIASTOLIC BLOOD PRESSURE: 97 MMHG | HEART RATE: 71 BPM | OXYGEN SATURATION: 96 % | SYSTOLIC BLOOD PRESSURE: 146 MMHG

## 2024-03-16 DIAGNOSIS — U07.1 COVID-19: Primary | ICD-10-CM

## 2024-03-16 LAB
B PARAPERT DNA SPEC QL NAA+PROBE: NOT DETECTED
B PERT DNA SPEC QL NAA+PROBE: NOT DETECTED
C PNEUM DNA NPH QL NAA+NON-PROBE: NOT DETECTED
FLUAV SUBTYP SPEC NAA+PROBE: NOT DETECTED
FLUBV RNA ISLT QL NAA+PROBE: NOT DETECTED
HADV DNA SPEC NAA+PROBE: NOT DETECTED
HCOV 229E RNA SPEC QL NAA+PROBE: NOT DETECTED
HCOV HKU1 RNA SPEC QL NAA+PROBE: NOT DETECTED
HCOV NL63 RNA SPEC QL NAA+PROBE: NOT DETECTED
HCOV OC43 RNA SPEC QL NAA+PROBE: NOT DETECTED
HMPV RNA NPH QL NAA+NON-PROBE: NOT DETECTED
HPIV1 RNA ISLT QL NAA+PROBE: NOT DETECTED
HPIV2 RNA SPEC QL NAA+PROBE: NOT DETECTED
HPIV3 RNA NPH QL NAA+PROBE: NOT DETECTED
HPIV4 P GENE NPH QL NAA+PROBE: NOT DETECTED
M PNEUMO IGG SER IA-ACNC: NOT DETECTED
RHINOVIRUS RNA SPEC NAA+PROBE: NOT DETECTED
RSV RNA NPH QL NAA+NON-PROBE: NOT DETECTED
S PYO AG THROAT QL: NEGATIVE
SARS-COV-2 RNA NPH QL NAA+NON-PROBE: DETECTED

## 2024-03-16 PROCEDURE — 96372 THER/PROPH/DIAG INJ SC/IM: CPT

## 2024-03-16 PROCEDURE — 25010000002 DEXAMETHASONE SODIUM PHOSPHATE 10 MG/ML SOLUTION

## 2024-03-16 PROCEDURE — 87081 CULTURE SCREEN ONLY: CPT | Performed by: EMERGENCY MEDICINE

## 2024-03-16 PROCEDURE — 0202U NFCT DS 22 TRGT SARS-COV-2: CPT | Performed by: EMERGENCY MEDICINE

## 2024-03-16 PROCEDURE — 99283 EMERGENCY DEPT VISIT LOW MDM: CPT

## 2024-03-16 PROCEDURE — 71045 X-RAY EXAM CHEST 1 VIEW: CPT

## 2024-03-16 PROCEDURE — 87880 STREP A ASSAY W/OPTIC: CPT

## 2024-03-16 RX ORDER — DEXAMETHASONE SODIUM PHOSPHATE 10 MG/ML
10 INJECTION, SOLUTION INTRAMUSCULAR; INTRAVENOUS ONCE
Status: COMPLETED | OUTPATIENT
Start: 2024-03-16 | End: 2024-03-16

## 2024-03-16 RX ADMIN — DEXAMETHASONE SODIUM PHOSPHATE 10 MG: 10 INJECTION INTRAMUSCULAR; INTRAVENOUS at 21:44

## 2024-03-16 NOTE — Clinical Note
[facility] EMERGENCY DEPARTMENT  [address] [address]  Phone: [phone]    Israel Jiménez was seen and treated in our emergency department on 3/16/2024.  He may return to work on 03/21/2024.         Thank you for choosing [facility].    Roshan Puentes PA-C

## 2024-03-17 NOTE — DISCHARGE INSTRUCTIONS
Follow-up with your primary care physician.  Make sure you plenty of rest and drink plenty of fluids.

## 2024-03-17 NOTE — ED PROVIDER NOTES
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  No difficulty in swallowing, tolerating p.o. liquids and food without difficulty.  Symptoms began 3 days ago, felt that he was starting to get sick.  He reports a cough productive of yellow sputum this morning.  No fevers.  No chest pain no shortness of air.  He has been around RSV and COVID and flu recently at his job.  Reports postnasal drip but no nasal congestion.      Nurses Notes reviewed and agree, including vitals, allergies, social history and prior medical history.     REVIEW OF SYSTEMS: All systems reviewed and not pertinent unless noted.  Review of Systems   Constitutional:  Negative for fever.   HENT:  Positive for postnasal drip and sore throat. Negative for congestion, rhinorrhea, sinus pain and trouble swallowing.    Respiratory:  Positive for cough. Negative for shortness of breath.    Cardiovascular:  Negative for chest pain.   Gastrointestinal:  Negative for abdominal pain, diarrhea, nausea and vomiting.   All other systems reviewed and are negative.      History reviewed. No pertinent past medical history.    Allergies:    Patient has no known allergies.      Past Surgical History:   Procedure Laterality Date    APPENDECTOMY N/A 9/6/2022    Procedure: APPENDECTOMY LAPAROSCOPIC;  Surgeon: Treva Sarmiento MD;  Location: [facility];  Service: General;  Laterality: N/A;    HERNIA REPAIR      LASIK Bilateral          Social History     Socioeconomic History    Marital status: Single   Tobacco Use    Smoking status: Never    Smokeless tobacco: Never   Vaping Use    Vaping status: Never Used   Substance and Sexual Activity    Alcohol use: Never    Drug use: Never    Sexual activity: Defer         History reviewed. No pertinent family history.    Objective  Physical Exam:  /97 (BP Location: Left arm, Patient Position: Sitting)   Pulse 71   " Temp 98.1 °F (36.7 °C) (Oral)   Resp 18   Ht 172.7 cm (68\")   Wt 90.7 kg (200 lb)   SpO2 96%   BMI 30.41 kg/m²      Physical Exam  Vitals and nursing note reviewed.   Constitutional:       General: He is not in acute distress.     Appearance: He is well-developed and normal weight. He is not ill-appearing, toxic-appearing or diaphoretic.   HENT:      Head: Normocephalic and atraumatic.      Nose: No congestion or rhinorrhea.      Mouth/Throat:      Mouth: Mucous membranes are moist. No oral lesions.      Pharynx: Oropharynx is clear. Uvula midline. Posterior oropharyngeal erythema present. No pharyngeal swelling, oropharyngeal exudate or uvula swelling.      Tonsils: No tonsillar exudate or tonsillar abscesses. 0 on the right. 0 on the left.   Eyes:      Extraocular Movements:      Right eye: Normal extraocular motion.      Left eye: Normal extraocular motion.      Conjunctiva/sclera: Conjunctivae normal.   Cardiovascular:      Rate and Rhythm: Normal rate and regular rhythm.      Heart sounds: Normal heart sounds.   Pulmonary:      Effort: Pulmonary effort is normal. No respiratory distress.      Breath sounds: Normal breath sounds. No stridor. No wheezing, rhonchi or rales.   Abdominal:      General: There is no distension.      Palpations: Abdomen is soft.      Tenderness: There is no abdominal tenderness. There is no guarding or rebound.   Musculoskeletal:      Cervical back: Normal range of motion and neck supple.   Lymphadenopathy:      Cervical: No cervical adenopathy.   Skin:     General: Skin is warm and dry.      Capillary Refill: Capillary refill takes less than 2 seconds.   Neurological:      General: No focal deficit present.      Mental Status: He is alert and oriented to person, place, and time.   Psychiatric:         Mood and Affect: Mood normal.         Behavior: Behavior normal.               Procedures    ED Course:    ED Course as of 03/16/24 2133   Sat Mar 16, 2024   2132 Given Decadron " IM 10 mg at patient request. [JR]      ED Course User Index  [JR] Roshan Puentes PA-C       Lab Results (last 24 hours)       Procedure Component Value Units Date/Time    Rapid Strep A Screen - Swab, Throat [[id number]]  (Normal) Collected: 03/16/24 2034    Specimen: Swab from Throat Updated: 03/16/24 2047     Strep A Ag Negative    Respiratory Panel PCR w/COVID-19(SARS-CoV-2) VERA/MARI/SHERMAN/PAD/COR/MIRIAM In-House, NP Swab in UTM/VTM, 2 HR TAT - Swab, Nasopharynx [[id number]]  (Abnormal) Collected: 03/16/24 2034    Specimen: Swab from Nasopharynx Updated: 03/16/24 2126     ADENOVIRUS, PCR Not Detected     Coronavirus 229E Not Detected     Coronavirus HKU1 Not Detected     Coronavirus NL63 Not Detected     Coronavirus OC43 Not Detected     COVID19 Detected     Human Metapneumovirus Not Detected     Human Rhinovirus/Enterovirus Not Detected     Influenza A PCR Not Detected     Influenza B PCR Not Detected     Parainfluenza Virus 1 Not Detected     Parainfluenza Virus 2 Not Detected     Parainfluenza Virus 3 Not Detected     Parainfluenza Virus 4 Not Detected     RSV, PCR Not Detected     Bordetella pertussis pcr Not Detected     Bordetella parapertussis PCR Not Detected     Chlamydophila pneumoniae PCR Not Detected     Mycoplasma pneumo by PCR Not Detected    Narrative:      In the setting of a positive respiratory panel with a viral infection PLUS a negative procalcitonin without other underlying concern for bacterial infection, consider observing off antibiotics or discontinuation of antibiotics and continue supportive care. If the respiratory panel is positive for atypical bacterial infection (Bordetella pertussis, Chlamydophila pneumoniae, or Mycoplasma pneumoniae), consider antibiotic de-escalation to target atypical bacterial infection.    Beta Strep Culture, Throat - Swab, Throat [[id number]] Collected: 03/16/24 2034    Specimen: Swab from Throat Updated: 03/16/24 2047             No radiology results from the last  24 hrs       MDM     Amount and/or Complexity of Data Reviewed  Clinical lab tests: reviewed        Initial impression of presenting illness: This is a 24-year-old male present emergency room today for evaluation of sore throat.    DDX: includes but is not limited to: Pharyngitis, viral pharyngitis, tonsillitis, uvulitis, peritonsillar abscess, COVID, flu, viral upper respiratory tract infection, bronchitis, pneumonia, others    Patient arrives hemodynamically stable afebrile nontachycardic nonhypoxic and nontoxic-appearing with vitals interpreted by myself.     Pertinent features from physical exam: Uvula midline nondeviated, no evidence of peritonsillar abscess.  No tonsillar swelling.  There is posterior oropharyngeal erythema without exudate.  No cervical adenopathy palpated.  Able to tolerate secretions at bedside p.o. without difficulty.  Lungs clear, cardiac oscitation regular rate and rhythm.  Abdomen soft nontender.  Benign physical exam findings.    Initial diagnostic plan: Rapid strep, respiratory panel, chest x-ray    Results from initial plan were reviewed and interpreted by me revealing rapid strep negative.  Respiratory panel positive for COVID-19.  Beta culture pending.  Chest x-ray no acute process per my interpretation.    Diagnostic information from other sources: Record reviewed    Interventions / Re-evaluation: Patient declined any interventions at this time however he did end up requesting Decadron 10 mg IM which was provided to the patient prior to discharge    Results/clinical rationale were discussed with patient at bedside    Consultations/Discussion of results with other physicians: N/A    Disposition plan: Discharge supportive care.  Return precautions given.  Will provide work note and recommend plenty of fluids and rest.  -----    Final diagnoses:   COVID-19          Roshan Puentes PA-C  03/16/24 2129       Roshan Puentes PA-C  03/16/24 2133

## 2024-03-18 LAB — BACTERIA SPEC AEROBE CULT: NORMAL

## (undated) DEVICE — MONOPOLAR METZENBAUM SCISSOR TIP, DISPOSABLE: Brand: MONOPOLAR METZENBAUM SCISSOR TIP, DISPOSABLE

## (undated) DEVICE — 2, DISPOSABLE SUCTION/IRRIGATOR WITHOUT DISPOSABLE TIP: Brand: STRYKEFLOW

## (undated) DEVICE — GLV SURG SENSICARE W/ALOE PF LF 7.5 STRL

## (undated) DEVICE — ENDOPATH ETS-FLEX45 ARTICULATING ENDOSCOPIC LINEAR CUTTER, NO RELOAD: Brand: ENDOPATH

## (undated) DEVICE — BLD CLIP UNIV SURG GRY

## (undated) DEVICE — UNDYED BRAIDED (POLYGLACTIN 910), SYNTHETIC ABSORBABLE SUTURE: Brand: COATED VICRYL

## (undated) DEVICE — ENDOPATH XCEL UNIVERSAL TROCAR STABLILITY SLEEVES: Brand: ENDOPATH XCEL

## (undated) DEVICE — ENDOPOUCH RETRIEVER SPECIMEN RETRIEVAL BAGS: Brand: ENDOPOUCH RETRIEVER

## (undated) DEVICE — ENDOPATH XCEL BLADELESS TROCARS WITH STABILITY SLEEVES: Brand: ENDOPATH XCEL

## (undated) DEVICE — RICH GENERAL LAPAROSCOPY: Brand: MEDLINE INDUSTRIES, INC.

## (undated) DEVICE — SLV SCD CALF HEMOFORCE DVT THERP REPROC MD

## (undated) DEVICE — SUT VIC 0/0 UR6 27IN DYED J603H

## (undated) DEVICE — SOL NACL 0.9PCT 1000ML